# Patient Record
Sex: FEMALE | Race: WHITE | NOT HISPANIC OR LATINO | Employment: FULL TIME | URBAN - NONMETROPOLITAN AREA
[De-identification: names, ages, dates, MRNs, and addresses within clinical notes are randomized per-mention and may not be internally consistent; named-entity substitution may affect disease eponyms.]

---

## 2023-09-20 ENCOUNTER — OFFICE VISIT (OUTPATIENT)
Dept: PRIMARY CARE | Facility: CLINIC | Age: 23
End: 2023-09-20
Payer: COMMERCIAL

## 2023-09-20 VITALS
BODY MASS INDEX: 35.04 KG/M2 | DIASTOLIC BLOOD PRESSURE: 82 MMHG | OXYGEN SATURATION: 99 % | WEIGHT: 190.4 LBS | HEART RATE: 72 BPM | SYSTOLIC BLOOD PRESSURE: 118 MMHG

## 2023-09-20 DIAGNOSIS — M70.61 GREATER TROCHANTERIC BURSITIS OF RIGHT HIP: ICD-10-CM

## 2023-09-20 DIAGNOSIS — M25.551 RIGHT HIP PAIN: Primary | ICD-10-CM

## 2023-09-20 PROCEDURE — 99213 OFFICE O/P EST LOW 20 MIN: CPT | Performed by: STUDENT IN AN ORGANIZED HEALTH CARE EDUCATION/TRAINING PROGRAM

## 2023-09-20 PROCEDURE — 1036F TOBACCO NON-USER: CPT | Performed by: STUDENT IN AN ORGANIZED HEALTH CARE EDUCATION/TRAINING PROGRAM

## 2023-09-20 RX ORDER — SEGESTERONE ACETATE AND ETHINYL ESTRADIOL 103; 17.4 MG/1; MG/1
RING VAGINAL
COMMUNITY
Start: 2022-12-27

## 2023-09-20 NOTE — PROGRESS NOTES
Subjective   Patient ID: Aiyana Castelan is a 22 y.o. female who presents for Intermittent pain in hip.    HPI    Right hip for a couple months. 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  3-4/10 pain. When wakes in the morning her pain is better.   Occurring 2-3 times a week. Mostly after work.     Review of Systems  ROS negative except discussed above in HPI.    Vitals:    09/20/23 0916   BP: 118/82   Pulse: 72   SpO2: 99%     Objective   Physical Exam  Musculoskeletal:      Comments: Right hip examination: Normal gait. Normal hip ROM. Tenderness in the greater trochanter bursa area.              Assessment/Plan   Aiyana was seen today for intermittent pain in hip.  Diagnoses and all orders for this visit:  Right hip pain (Primary)  -     XR hip right 2 or 3 views; Future  Greater trochanteric bursitis of right hip      Follow up as needed.          Juan Ramon Davidson MD MPH

## 2023-09-26 ENCOUNTER — TELEPHONE (OUTPATIENT)
Dept: PRIMARY CARE | Facility: CLINIC | Age: 23
End: 2023-09-26
Payer: COMMERCIAL

## 2023-10-08 PROBLEM — B35.4 TINEA CORPORIS: Status: ACTIVE | Noted: 2023-10-08

## 2023-10-08 PROBLEM — B09 VIRAL EXANTHEM: Status: ACTIVE | Noted: 2023-10-08

## 2023-10-08 PROBLEM — R21 SKIN RASH: Status: ACTIVE | Noted: 2023-10-08

## 2023-10-08 RX ORDER — TRIAMCINOLONE ACETONIDE 1 MG/ML
LOTION TOPICAL
COMMUNITY

## 2023-10-08 RX ORDER — PREDNISONE 10 MG/1
TABLET ORAL
COMMUNITY

## 2023-10-10 ENCOUNTER — OFFICE VISIT (OUTPATIENT)
Dept: ORTHOPEDIC SURGERY | Facility: CLINIC | Age: 23
End: 2023-10-10
Payer: COMMERCIAL

## 2023-10-10 DIAGNOSIS — M25.551 HIP PAIN, RIGHT: ICD-10-CM

## 2023-10-10 DIAGNOSIS — M25.551 RIGHT HIP PAIN: ICD-10-CM

## 2023-10-10 DIAGNOSIS — M70.61 GREATER TROCHANTERIC BURSITIS OF BOTH HIPS: Primary | ICD-10-CM

## 2023-10-10 DIAGNOSIS — M70.62 GREATER TROCHANTERIC BURSITIS OF BOTH HIPS: Primary | ICD-10-CM

## 2023-10-10 PROCEDURE — 99204 OFFICE O/P NEW MOD 45 MIN: CPT | Performed by: SPECIALIST

## 2023-10-10 PROCEDURE — 1036F TOBACCO NON-USER: CPT | Performed by: SPECIALIST

## 2023-10-10 ASSESSMENT — PAIN SCALES - GENERAL: PAINLEVEL_OUTOF10: 4

## 2023-10-10 ASSESSMENT — PAIN DESCRIPTION - DESCRIPTORS: DESCRIPTORS: ACHING

## 2023-10-10 ASSESSMENT — PAIN - FUNCTIONAL ASSESSMENT: PAIN_FUNCTIONAL_ASSESSMENT: 0-10

## 2023-10-10 NOTE — ASSESSMENT & PLAN NOTE
Assessment: Bilateral greater trochanteric bursitis right greater than left    Plan:  Her x-ray reading showed some narrowing of the superior hip joint.  However, they look symmetrical and within normal limits for the patient's age on my assessment.  She has no clinical indications of hip arthritis.  We could recheck a pelvis x-ray in 6 months or a year to confirm that there is no progression.  Obviously if her symptoms require it we can pursue this more aggressively.  Voltaren gel  Motrin 600 mg p.o. 3 times a day with meals as needed pain  Physical therapy for trochanteric bursitis stretching and strengthening with antiedema modalities  Follow-up in 1 month for reevaluation  I talked to her about weight loss and we discussed some strategies

## 2023-10-10 NOTE — PROGRESS NOTES
Assessment/Plan   Encounter Diagnoses:  Greater trochanteric bursitis of both hips    Hip pain, right    Right hip pain  Greater trochanteric bursitis of both hips  Assessment: Bilateral greater trochanteric bursitis right greater than left    Plan:  Her x-ray reading showed some narrowing of the superior hip joint.  However, they look symmetrical and within normal limits for the patient's age on my assessment.  She has no clinical indications of hip arthritis.  We could recheck a pelvis x-ray in 6 months or a year to confirm that there is no progression.  Obviously if her symptoms require it we can pursue this more aggressively.  Voltaren gel  Motrin 600 mg p.o. 3 times a day with meals as needed pain  Physical therapy for trochanteric bursitis stretching and strengthening with antiedema modalities  Follow-up in 1 month for reevaluation  I talked to her about weight loss and we discussed some strategies         Subjective    Patient ID: Aiyana Castelan is a 22 y.o. female.    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  She is complaining of pain in both of her hips and she points to the greater trochanteric areas.  X-rays were obtained as part of her work-up and they suggested some narrowing of the hip joint.  However, this is a subtle finding at best and she does not have any clinical indications of hip arthritis.    OBJECTIVE: ORTHO EXAM  Right hip:  Normal gait  Negative Trendelenburg sign  Skin healthy and intact  No tenderness to palpation over lumbar spine  Moderate tenderness over greater trochanter  No tenderness referable to groin especially with IR     Full forward flexion  Symmetric motion, no loss of internal rotation   No weakness with resisted hip flexion, abduction or adduction     Negative flexion/adduction/internal  rotation  Negative flexion/abduction/external rotation test  Negative straight leg raise  Neurovascular exam normal distally      Left hip:  Normal gait  Negative Trendelenburg sign  Skin healthy and intact  No tenderness to palpation over lumbar spine  Mild tenderness over greater trochanter  No tenderness referable to groin especially with IR     Full forward flexion  Symmetric motion, no loss of internal rotation   No weakness with resisted hip flexion, abduction or adduction     Negative flexion/adduction/internal rotation  Negative flexion/abduction/external rotation test  Negative straight leg raise  Neurovascular exam normal distally  IMAGE RESULTS:  XR hip w pelvis  Narrative: Interpreted By:  CADY MARR MD  MRN: [id number]  Patient Name: JACOB STONER     STUDY:  HIP, UNILATERAL W/PELVIS WHEN PERFORMED 2-3 VIEWS; Right;  9/20/2023  9:58 am     INDICATION:  Right hip pain -mostly  in the greater trochateric bursa area..     COMPARISON:  None.     ACCESSION NUMBER(S):  [id number]     ORDERING CLINICIAN:  JOSE CHARLTON     FINDINGS:  PELVIS AP, RIGHT HIP AP AND LATERAL  Pelvic bones are intact. Mild narrowing of both hip joints is seen  superiorly. No osteophyte formation or periarticular calcification.     Impression: Mild narrowing of both hip joints superiorly.        MACRO:  None      ULTRASOUND  No    PROCEDURES  None    Orders Placed This Encounter    Point of Care Ultrasound    Referral to Physical Therapy

## 2023-10-25 ENCOUNTER — APPOINTMENT (OUTPATIENT)
Dept: PHYSICAL THERAPY | Facility: CLINIC | Age: 23
End: 2023-10-25
Payer: COMMERCIAL

## 2023-11-08 ENCOUNTER — EVALUATION (OUTPATIENT)
Dept: PHYSICAL THERAPY | Facility: CLINIC | Age: 23
End: 2023-11-08
Payer: COMMERCIAL

## 2023-11-08 DIAGNOSIS — M25.551 HIP PAIN, RIGHT: ICD-10-CM

## 2023-11-08 PROCEDURE — 97161 PT EVAL LOW COMPLEX 20 MIN: CPT | Mod: GP

## 2023-11-08 PROCEDURE — 97110 THERAPEUTIC EXERCISES: CPT | Mod: GP

## 2023-11-08 ASSESSMENT — ENCOUNTER SYMPTOMS
LOSS OF SENSATION IN FEET: 0
OCCASIONAL FEELINGS OF UNSTEADINESS: 0
DEPRESSION: 0

## 2023-11-08 ASSESSMENT — PAIN SCALES - GENERAL: PAINLEVEL_OUTOF10: 0 - NO PAIN

## 2023-11-08 ASSESSMENT — PATIENT HEALTH QUESTIONNAIRE - PHQ9
1. LITTLE INTEREST OR PLEASURE IN DOING THINGS: NOT AT ALL
SUM OF ALL RESPONSES TO PHQ9 QUESTIONS 1 AND 2: 0
2. FEELING DOWN, DEPRESSED OR HOPELESS: NOT AT ALL

## 2023-11-08 ASSESSMENT — PAIN - FUNCTIONAL ASSESSMENT: PAIN_FUNCTIONAL_ASSESSMENT: 0-10

## 2023-11-08 NOTE — LETTER
November 12, 2023     Patient: Aiyana Castelan   YOB: 2000   Date of Visit: 11/8/2023       To Whom It May Concern:    It is my medical opinion that Aiyana Castelan {Work release (duty restriction):26213}.    If you have any questions or concerns, please don't hesitate to call.         Sincerely,        Glendy Machado, PT    CC: No Recipients

## 2023-11-08 NOTE — PROGRESS NOTES
Physical Therapy    Physical Therapy Evaluation and Treatment      Patient Name: Aiyana Castelan  MRN: [id number]  Today's Date: 11/8/2023  Time Calculation  Start Time: 1402  Stop Time: 1430  Time Calculation (min): 28 min      Assessment:  PT Assessment Results: Decreased strength, Decreased range of motion, Pain  Rehab Prognosis: Good  Strengths: Capable of completing ADLs semi/independent, Attitude of self, Premorbid level of function, Physical health  Barriers to Participation: Other (Comment) (chronicity of pain)  Patient demonstrating R hip pain, restriction of surrounding R hip musculature, decreased R hip musculature strength, and functional mobility deficits per LEFS score. This limits patient's ability to complete ADLs/iADLs painfree. Patient to continue with independent HEP to address R hip mobility and strength with patient in agreement with POC. Educated in mobility and strengthening exercises with verbal cues to perform within painfree ROM. HEP handout provided. No change in pain post treatment.    Pt is being placed on hold for 30 days to attempt performing their home exercise program independently. Pt instructed to contact with any problems, questions, or adjustments. This will serve as the patient's discharge if they elect not to resume skilled PT within 30 days.     Plan:  PT Plan: Initial Assessment and Treatment only  PT Frequency: Other (Comment)  Onset Date: 10/10/23  Pt is being placed on hold for 30 days to attempt performing their home exercise program independently. Pt instructed to contact with any problems, questions, or adjustments. This will serve as the patient's discharge if they elect not to resume skilled PT within 30 days.     Current Problem:   1. Hip pain, right  Referral to Physical Therapy          Subjective    General: Patient reporting chronic R hip since spring. Has improved since seeing the doctor but not completely at baseline. Taking ibuprofen for pain management which  does improve pain. States more pain with prolonged standing and with legs stretched out for prolonged periods. No use of heat/ice. Is performing some home stretches to manage pain. No reports of numbness/tingling  General  Reason for Referral: R hip pain  Referred By: Natalie DOOLEY  Precautions:  Precautions  STEADI Fall Risk Score (The score of 4 or more indicates an increased risk of falling): 0  Precautions Comment: No significant medical history    Pain:  Pain Assessment  Pain Assessment: 0-10  Pain Score: 0 - No pain  Pain Location: Hip  Pain Orientation: Right  Home Living:   No concerns with home set-up  Prior Level of Function:   Independent in all ADLs/iADLs with no restriction    Objective     HIP  Hip Palpation/Joint Mobility Assessment  Palpation / Joint Mobility Comment: mod restriction of ITB, R hamstring    Hip AROM WFL unless documented below  R hip flexion: (125°): 110 deg  R hip abduction: (45°): 45 deg    Specific Lower Extremity MMT WFL unless documented below  R Iliopsoas: (5/5): 4  L Iliopsoas: (5/5): 4  R Gluteals (prone): (5/5): 4  L Gluteals (prone): (5/5): 4  R Gluteals (sidelying): (5/5): 4  L Gluteals (sidelying): (5/5): 4    Special Tests Negative unless documented below  Supine SLR: (Negative): (-)  GUMARO: (Negative): (+) R hip for pain and restriction of R piriformis    Outcome Measures:  Other Measures  Lower Extremity Funtional Score (LEFS): 77/80     Treatments:  Therapeutic Exercise  Therapeutic Exercise Performed: Yes  R ITB stretch with strap supine 3x20 second  Verbal review of s/l R ITB stretch  R piriformis stretch in supine 3x20 second  R SLR (neutral, foot everted, foot inverted) x10 each direction  L s/l R hip abduction x10  Butterfly stretch 3x20 second    OP EDUCATION:  Education  Individual(s) Educated: Patient  Education Provided: POC, Home Exercise Program, Other (ice vs heat)  Risk and Benefits Discussed with Patient/Caregiver/Other: yes  Patient/Caregiver Demonstrated  Understanding: yes  Plan of Care Discussed and Agreed Upon: yes  Patient Response to Education: Patient/Caregiver Verbalized Understanding of Information, Patient/Caregiver Performed Return Demonstration of Exercises/Activities, Patient/Caregiver Asked Appropriate Questions  Education Comment: Access Code: [email or web address]  URL: [email or web address]  Date: 11/08/2023  Prepared by: Glendy Machado    Exercises  - Supine ITB Stretch with Strap  - 1 x daily - 7 x weekly - 1 sets - 3 reps - 20-30second hold  - Sidelying ITB Stretch off Table  - 1 x daily - 7 x weekly - 1 sets - 3 reps - 20-30second hold  - Supine Piriformis Stretch with Leg Straight  - 1 x daily - 7 x weekly - 1 sets - 3 reps - 20-30second hold  - Supine Active Straight Leg Raise  - 1 x daily - 7 x weekly - 1-2 sets - 10 reps  - Sidelying Hip Abduction  - 1 x daily - 7 x weekly - 1-2 sets - 10 reps  - Supine Butterfly Groin Stretch  - 1 x daily - 7 x weekly - 1 sets - 3 reps - 20-30second hold

## 2023-11-08 NOTE — LETTER
November 12, 2023     Patient: Aiyana Castelan   YOB: 2000   Date of Visit: 11/8/2023       To Whom it May Concern:    Aiyana Castelan was seen in my clinic on 11/8/2023. She {Return to school/sport:57152}.    If you have any questions or concerns, please don't hesitate to call.         Sincerely,          Glendy Machado, PT        CC: No Recipients

## 2023-11-10 ENCOUNTER — OFFICE VISIT (OUTPATIENT)
Dept: ORTHOPEDIC SURGERY | Facility: CLINIC | Age: 23
End: 2023-11-10
Payer: COMMERCIAL

## 2023-11-10 DIAGNOSIS — M25.551 RIGHT HIP PAIN: ICD-10-CM

## 2023-11-10 PROCEDURE — 1036F TOBACCO NON-USER: CPT | Performed by: SPECIALIST

## 2023-11-10 PROCEDURE — 99213 OFFICE O/P EST LOW 20 MIN: CPT | Performed by: SPECIALIST

## 2023-11-10 ASSESSMENT — PAIN DESCRIPTION - DESCRIPTORS: DESCRIPTORS: ACHING

## 2023-11-10 ASSESSMENT — PAIN - FUNCTIONAL ASSESSMENT: PAIN_FUNCTIONAL_ASSESSMENT: 0-10

## 2023-11-10 ASSESSMENT — PAIN SCALES - GENERAL: PAINLEVEL_OUTOF10: 0 - NO PAIN

## 2023-11-10 NOTE — ASSESSMENT & PLAN NOTE
Assessment: Bilateral trochanteric hip pain and low back pain which has resolved with physical therapy.    Plan:  Continue with a home exercise program.  Over-the-counter nonsteroidal anti-inflammatories including ibuprofen and Voltaren gel  Follow-up on a as needed basis.

## 2023-11-10 NOTE — PROGRESS NOTES
Assessment/Plan   Greater trochanteric bursitis of both hips  Assessment: Bilateral trochanteric hip pain and low back pain which has resolved with physical therapy.    Plan:  Continue with a home exercise program.  Over-the-counter nonsteroidal anti-inflammatories including ibuprofen and Voltaren gel  Follow-up on a as needed basis.           Subjective    Patient ID: Aiyana Castelan is a 22 y.o. female.    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  She is complaining of pain in both of her hips and she points to the greater trochanteric areas.  X-rays were obtained as part of her work-up and they suggested some narrowing of the hip joint.  However, this is a subtle finding at best and she does not have any clinical indications of hip arthritis.    OBJECTIVE: ORTHO EXAM  Right hip:  Normal gait  Negative Trendelenburg sign  Skin healthy and intact  No tenderness to palpation over lumbar spine  Moderate tenderness over greater trochanter  No tenderness referable to groin especially with IR     Full forward flexion  Symmetric motion, no loss of internal rotation   No weakness with resisted hip flexion, abduction or adduction     Negative flexion/adduction/internal rotation  Negative flexion/abduction/external rotation test  Negative straight leg raise  Neurovascular exam normal distally  Heel and toe walk without c/o    Left hip:  Normal gait  Negative Trendelenburg sign  Skin healthy and intact  No tenderness to palpation over lumbar spine  Mild tenderness over greater trochanter  No tenderness referable to groin especially with IR     Full forward flexion  Symmetric motion, no loss of internal rotation   No weakness with resisted hip flexion, abduction or adduction     Negative flexion/adduction/internal rotation  Negative  flexion/abduction/external rotation test  Negative straight leg raise  Neurovascular exam normal distally  IMAGE RESULTS:  XR hip w pelvis  Narrative: Interpreted By:  CADY MARR MD  MRN: [id number]  Patient Name: JACOB STONER     STUDY:  HIP, UNILATERAL W/PELVIS WHEN PERFORMED 2-3 VIEWS; Right;  9/20/2023  9:58 am     INDICATION:  Right hip pain -mostly  in the greater trochateric bursa area..     COMPARISON:  None.     ACCESSION NUMBER(S):  [id number]     ORDERING CLINICIAN:  JOSE CHARLTON     FINDINGS:  PELVIS AP, RIGHT HIP AP AND LATERAL  Pelvic bones are intact. Mild narrowing of both hip joints is seen  superiorly. No osteophyte formation or periarticular calcification.     Impression: Mild narrowing of both hip joints superiorly.        MACRO:  None      ULTRASOUND  No    PROCEDURES  None    Orders Placed This Encounter    Point of Care Ultrasound    Point of Care Ultrasound    Follow Up In Orthopaedic Surgery

## 2024-10-16 ENCOUNTER — APPOINTMENT (OUTPATIENT)
Age: 24
End: 2024-10-16
Payer: COMMERCIAL

## 2024-11-06 ENCOUNTER — APPOINTMENT (OUTPATIENT)
Age: 24
End: 2024-11-06
Payer: COMMERCIAL